# Patient Record
Sex: MALE | Race: BLACK OR AFRICAN AMERICAN | NOT HISPANIC OR LATINO | Employment: FULL TIME | URBAN - METROPOLITAN AREA
[De-identification: names, ages, dates, MRNs, and addresses within clinical notes are randomized per-mention and may not be internally consistent; named-entity substitution may affect disease eponyms.]

---

## 2022-07-04 ENCOUNTER — OFFICE VISIT (OUTPATIENT)
Dept: URGENT CARE | Facility: CLINIC | Age: 37
End: 2022-07-04
Payer: COMMERCIAL

## 2022-07-04 VITALS
OXYGEN SATURATION: 96 % | WEIGHT: 143.88 LBS | BODY MASS INDEX: 22.58 KG/M2 | HEART RATE: 67 BPM | SYSTOLIC BLOOD PRESSURE: 144 MMHG | RESPIRATION RATE: 18 BRPM | DIASTOLIC BLOOD PRESSURE: 85 MMHG | HEIGHT: 67 IN | TEMPERATURE: 98 F

## 2022-07-04 DIAGNOSIS — L02.211 ABSCESS OF ABDOMINAL WALL: Primary | ICD-10-CM

## 2022-07-04 PROCEDURE — 3077F SYST BP >= 140 MM HG: CPT | Mod: CPTII,S$GLB,, | Performed by: NURSE PRACTITIONER

## 2022-07-04 PROCEDURE — 99203 OFFICE O/P NEW LOW 30 MIN: CPT | Mod: S$GLB,,, | Performed by: NURSE PRACTITIONER

## 2022-07-04 PROCEDURE — 1160F RVW MEDS BY RX/DR IN RCRD: CPT | Mod: CPTII,S$GLB,, | Performed by: NURSE PRACTITIONER

## 2022-07-04 PROCEDURE — 3008F BODY MASS INDEX DOCD: CPT | Mod: CPTII,S$GLB,, | Performed by: NURSE PRACTITIONER

## 2022-07-04 PROCEDURE — 99203 PR OFFICE/OUTPT VISIT, NEW, LEVL III, 30-44 MIN: ICD-10-PCS | Mod: S$GLB,,, | Performed by: NURSE PRACTITIONER

## 2022-07-04 PROCEDURE — 1159F PR MEDICATION LIST DOCUMENTED IN MEDICAL RECORD: ICD-10-PCS | Mod: CPTII,S$GLB,, | Performed by: NURSE PRACTITIONER

## 2022-07-04 PROCEDURE — 3079F DIAST BP 80-89 MM HG: CPT | Mod: CPTII,S$GLB,, | Performed by: NURSE PRACTITIONER

## 2022-07-04 PROCEDURE — 3077F PR MOST RECENT SYSTOLIC BLOOD PRESSURE >= 140 MM HG: ICD-10-PCS | Mod: CPTII,S$GLB,, | Performed by: NURSE PRACTITIONER

## 2022-07-04 PROCEDURE — 3008F PR BODY MASS INDEX (BMI) DOCUMENTED: ICD-10-PCS | Mod: CPTII,S$GLB,, | Performed by: NURSE PRACTITIONER

## 2022-07-04 PROCEDURE — 1159F MED LIST DOCD IN RCRD: CPT | Mod: CPTII,S$GLB,, | Performed by: NURSE PRACTITIONER

## 2022-07-04 PROCEDURE — 3079F PR MOST RECENT DIASTOLIC BLOOD PRESSURE 80-89 MM HG: ICD-10-PCS | Mod: CPTII,S$GLB,, | Performed by: NURSE PRACTITIONER

## 2022-07-04 PROCEDURE — 1160F PR REVIEW ALL MEDS BY PRESCRIBER/CLIN PHARMACIST DOCUMENTED: ICD-10-PCS | Mod: CPTII,S$GLB,, | Performed by: NURSE PRACTITIONER

## 2022-07-04 RX ORDER — MUPIROCIN 20 MG/G
OINTMENT TOPICAL 3 TIMES DAILY
Qty: 30 G | Refills: 0 | Status: SHIPPED | OUTPATIENT
Start: 2022-07-04

## 2022-07-04 RX ORDER — SULFAMETHOXAZOLE AND TRIMETHOPRIM 800; 160 MG/1; MG/1
1 TABLET ORAL 2 TIMES DAILY
Qty: 14 TABLET | Refills: 0 | Status: SHIPPED | OUTPATIENT
Start: 2022-07-04 | End: 2022-07-04 | Stop reason: SDUPTHER

## 2022-07-04 RX ORDER — SULFAMETHOXAZOLE AND TRIMETHOPRIM 800; 160 MG/1; MG/1
1 TABLET ORAL 2 TIMES DAILY
Qty: 14 TABLET | Refills: 0 | Status: SHIPPED | OUTPATIENT
Start: 2022-07-04 | End: 2022-07-11

## 2022-07-04 RX ORDER — MUPIROCIN 20 MG/G
OINTMENT TOPICAL 3 TIMES DAILY
Qty: 30 G | Refills: 0 | Status: SHIPPED | OUTPATIENT
Start: 2022-07-04 | End: 2022-07-04 | Stop reason: SDUPTHER

## 2022-07-04 NOTE — PROGRESS NOTES
"Subjective:       Patient ID: Benito Sol is a 37 y.o. male.    Vitals:  height is 5' 6.93" (1.7 m) and weight is 65.2 kg (143 lb 13.6 oz). His temperature is 97.6 °F (36.4 °C). His blood pressure is 144/85 (abnormal) and his pulse is 67. His respiration is 18 and oxygen saturation is 96%.     Chief Complaint: Abscess    37-year-old male presents to clinic for evaluation of abscess to stomach.  Patient states that over the last 2 days it has been getting bigger.  He states that he has applied rubbing alcohol, and it opened up.  He states that it started to drain a little.  Denies fever He is awake and alert, no acute distress noted on today's visit.    Abscess  Chronicity:  NewProgression Since Onset: worsening  Location:  Torso  Associated Symptoms: no fever, no chills  Characteristics: draining, painful and blistering    Pain Scale:  10/10  Treatments Tried:  Topical antibiotics  Relieved by:  Nothing  Worsened by:  Nothing      Constitution: Negative for activity change, appetite change, chills, sweating, fatigue and fever.   HENT: Negative.    Cardiovascular: Negative.    Respiratory: Negative.    Gastrointestinal: Negative.    Skin: Positive for abscess. Negative for erythema.   Allergic/Immunologic: Positive for itching.       Objective:      Physical Exam   Constitutional: He appears well-developed.   HENT:   Head: Normocephalic and atraumatic. Head is without abrasion, without contusion and without laceration.   Ears:   Right Ear: External ear normal.   Left Ear: External ear normal.   Nose: Nose normal.   Mouth/Throat: Oropharynx is clear and moist and mucous membranes are normal.   Eyes: Conjunctivae, EOM and lids are normal. Right eye exhibits no discharge. Left eye exhibits no discharge.   Neck: Trachea normal and phonation normal. Neck supple.   Cardiovascular: Normal rate.   Pulmonary/Chest: Effort normal. No respiratory distress.   Musculoskeletal: Normal range of motion.         General: Normal " range of motion.   Neurological: He is alert.   Skin: Skin is warm, dry, intact, no rash and abscessed. lesion No abrasion, No burn, No bruising, No erythema and No ecchymosis        Psychiatric: His speech is normal and behavior is normal. Judgment and thought content normal.   Nursing note and vitals reviewed.        Assessment:       1. Abscess of abdominal wall          Plan:         Abscess of abdominal wall  -     sulfamethoxazole-trimethoprim 800-160mg (BACTRIM DS) 800-160 mg Tab; Take 1 tablet by mouth 2 (two) times daily. for 7 days  Dispense: 14 tablet; Refill: 0  -     mupirocin (BACTROBAN) 2 % ointment; Apply topically 3 (three) times daily.  Dispense: 30 g; Refill: 0    - Discussed I&D with patient, patient does not wish to have incision done at this time.  Patient only requesting antibiotics.  Discussed strict follow-up if symptoms do not improve.  Patient verbalized understanding and is in agreement with plan.    Patient Instructions   - You must understand that you have received an Urgent Care treatment only and that you may be released before all of your medical problems are known or treated.   - You, the patient, will arrange for follow up care as instructed.   - If your condition worsens or fails to improve we recommend that you receive another evaluation at the ER immediately or contact your PCP to discuss your concerns or return here.

## 2022-11-04 ENCOUNTER — OFFICE VISIT (OUTPATIENT)
Dept: URGENT CARE | Facility: CLINIC | Age: 37
End: 2022-11-04
Payer: OTHER MISCELLANEOUS

## 2022-11-04 VITALS
BODY MASS INDEX: 22.98 KG/M2 | DIASTOLIC BLOOD PRESSURE: 93 MMHG | HEART RATE: 45 BPM | HEIGHT: 66 IN | OXYGEN SATURATION: 98 % | TEMPERATURE: 98 F | SYSTOLIC BLOOD PRESSURE: 164 MMHG | WEIGHT: 143 LBS | RESPIRATION RATE: 20 BRPM

## 2022-11-04 DIAGNOSIS — S90.02XA CONTUSION OF LEFT ANKLE, INITIAL ENCOUNTER: ICD-10-CM

## 2022-11-04 DIAGNOSIS — S90.32XA CONTUSION OF LEFT FOOT, INITIAL ENCOUNTER: ICD-10-CM

## 2022-11-04 DIAGNOSIS — Z02.6 ENCOUNTER RELATED TO WORKER'S COMPENSATION CLAIM: Primary | ICD-10-CM

## 2022-11-04 LAB
CTP QC/QA: YES
POC 10 PANEL DRUG SCREEN: ABNORMAL

## 2022-11-04 PROCEDURE — 73620 X-RAY EXAM OF FOOT: CPT | Mod: LT,S$GLB,, | Performed by: RADIOLOGY

## 2022-11-04 PROCEDURE — 73610 XR ANKLE COMPLETE 3 VIEW LEFT: ICD-10-PCS | Mod: LT,S$GLB,, | Performed by: RADIOLOGY

## 2022-11-04 PROCEDURE — 80305 DRUG TEST PRSMV DIR OPT OBS: CPT | Mod: S$GLB,,,

## 2022-11-04 PROCEDURE — 99212 PR OFFICE/OUTPT VISIT, EST, LEVL II, 10-19 MIN: ICD-10-PCS | Mod: S$GLB,,,

## 2022-11-04 PROCEDURE — 73620 XR FOOT 2 VIEW LEFT: ICD-10-PCS | Mod: LT,S$GLB,, | Performed by: RADIOLOGY

## 2022-11-04 PROCEDURE — 99212 OFFICE O/P EST SF 10 MIN: CPT | Mod: S$GLB,,,

## 2022-11-04 PROCEDURE — 73610 X-RAY EXAM OF ANKLE: CPT | Mod: LT,S$GLB,, | Performed by: RADIOLOGY

## 2022-11-04 PROCEDURE — 80305 OOH NON-DOT DRUG SCREEN: ICD-10-PCS | Mod: S$GLB,,,

## 2022-11-04 NOTE — LETTER
[facility]  [address] [address]  Phone: [phone]  Fax: [phone]  [facility]: [email or web address]    Pt Name: Benito Sol  Injury Date: 11/04/2022   Employee ID: [id number] Date of First Treatment: 11/04/2022   Company: [employer]      Appointment Time: 11:15 AM Arrived: 11:00 AM    Provider: Derrick Sadler, DO Time Out: 1:45 PM     Office Treatment:   1. Encounter related to worker's compensation claim    2. Contusion of left foot, initial encounter    3. Contusion of left ankle, initial encounter          Patient Instructions: Daily home exercises/warm soaks (Use over-the-counter anti-inflammatories or Tylenol as directed on label)      Restrictions: Regular Duty, Discharged from Occupational Health     Return As needed. RANJITH

## 2022-11-04 NOTE — PROGRESS NOTES
Subjective:       Patient ID: Benito Sol is a 37 y.o. male.    Chief Complaint: Leg Injury (Left) and Foot Injury (Left)    Benito and a co-worker were pushing a cart with a large stack of soda bottles when it accidentally rolled up and onto his right foot in up to his ankle area causing him to fall backwards.  States he has left foot/ankle pain and difficulty walking as a result of the injury.  He presents to Occupational Health sitting in a wheelchair.    New Injury Left Foot/Leg (DOI 11/4/22). He works for [employer] as a . He states that another employee was pushing an excessive weight and it rolled over his left foot up his left leg. Once the weight started to roll over him he started feeling pain and a burning sensation. It is very difficult for him to walk. He states that he has numbness and slight swelling in his foot.     EC    Foot Injury       Constitution: Positive for activity change.   HENT: Negative.     Neck: neck negative.   Cardiovascular: Negative.    Eyes: Negative.    Respiratory: Negative.     Gastrointestinal: Negative.    Endocrine: negative.   Musculoskeletal:  Positive for pain, joint pain, abnormal ROM of joint and pain with walking.   Skin:  Positive for color change and bruising.   Neurological: Negative.       Objective:      Physical Exam  Vitals and nursing note reviewed.   Constitutional:       General: He is not in acute distress.  HENT:      Head: Normocephalic.   Eyes:      Conjunctiva/sclera: Conjunctivae normal.   Musculoskeletal:      Cervical back: No pain with movement.      Left foot: Normal range of motion. Tenderness present. No swelling, deformity or laceration. Normal pulse.        Feet:       Comments: There is a 1.5 cm diameter area of abraded skin to the anterior medial left ankle region.  No overlying swelling or bruising noted.  Mild tenderness over the skin abraded site and at the proximal base of the 1st metatarsal.  He has full range of motion  of the left ankle and left toes.  No ligamentous instability.  He was able to stand up and walk on the left foot without difficulty or assistance.  He does not appear to be any distress during palpation, range of motion assessment, or while walking.   Skin:     General: Skin is warm.      Capillary Refill: Capillary refill takes less than 2 seconds.   Neurological:      General: No focal deficit present.      Mental Status: He is alert and oriented to person, place, and time.      Gait: Gait is intact.   Psychiatric:         Attention and Perception: Attention normal.         Mood and Affect: Mood and affect normal.         Speech: Speech normal.         Behavior: Behavior normal. Behavior is cooperative.       No acute findings noted on radiographs.  Pending radiologist's final review.  Assessment:       1. Encounter related to worker's compensation claim    2. Contusion of left foot, initial encounter    3. Contusion of left ankle, initial encounter          Plan:       Radiograph results were discussed with him today.  He will likely have some soreness to his foot and ankle the next couple days for which she can take over-the-counter medications to help with the symptoms.  Follow-up as needed     Patient Instructions: Daily home exercises/warm soaks (Use over-the-counter anti-inflammatories or Tylenol as directed on label)   Restrictions: Regular Duty, Discharged from Occupational Health  Follow up if symptoms worsen or fail to improve.

## 2024-07-01 ENCOUNTER — OFFICE VISIT (OUTPATIENT)
Dept: URGENT CARE | Facility: CLINIC | Age: 39
End: 2024-07-01
Payer: COMMERCIAL

## 2024-07-01 VITALS
HEIGHT: 66 IN | OXYGEN SATURATION: 95 % | BODY MASS INDEX: 22.98 KG/M2 | WEIGHT: 143 LBS | RESPIRATION RATE: 18 BRPM | HEART RATE: 71 BPM | TEMPERATURE: 97 F | SYSTOLIC BLOOD PRESSURE: 143 MMHG | DIASTOLIC BLOOD PRESSURE: 87 MMHG

## 2024-07-01 DIAGNOSIS — M47.816 SPONDYLOSIS OF LUMBAR REGION WITHOUT MYELOPATHY OR RADICULOPATHY: ICD-10-CM

## 2024-07-01 DIAGNOSIS — S61.511D LACERATION OF RIGHT WRIST, SUBSEQUENT ENCOUNTER: Primary | ICD-10-CM

## 2024-07-01 PROCEDURE — 99213 OFFICE O/P EST LOW 20 MIN: CPT | Mod: S$GLB,,, | Performed by: FAMILY MEDICINE

## 2024-07-01 PROCEDURE — 99024 POSTOP FOLLOW-UP VISIT: CPT | Mod: S$GLB,,, | Performed by: FAMILY MEDICINE

## 2024-07-01 RX ORDER — OXYCODONE AND ACETAMINOPHEN 5; 325 MG/1; MG/1
1 TABLET ORAL NIGHTLY PRN
Qty: 7 TABLET | Refills: 0 | Status: SHIPPED | OUTPATIENT
Start: 2024-07-01

## 2024-07-01 RX ORDER — METHOCARBAMOL 750 MG/1
750 TABLET, FILM COATED ORAL 3 TIMES DAILY
Qty: 21 TABLET | Refills: 0 | Status: SHIPPED | OUTPATIENT
Start: 2024-07-01 | End: 2024-07-08

## 2024-07-01 NOTE — PROCEDURES
Suture Removal    Date/Time: 7/1/2024 6:30 PM  Location procedure was performed: [facility]    Performed by: Edinson Curran MD  Authorized by: Edinson Curran MD  Body area: upper extremity  Location details: right wrist  Wound Appearance: clean and well healed  Sutures Removed: 3  Post-removal: no dressing applied  Complications: No  Estimated blood loss (mL): 0  Specimens: No  Implants: No  Patient tolerance: Patient tolerated the procedure well with no immediate complications

## 2024-07-01 NOTE — PROGRESS NOTES
"Subjective:      Patient ID: Benito Sol is a 39 y.o. male.    Vitals:  height is 5' 6" (1.676 m) and weight is 64.9 kg (143 lb). His oral temperature is 97 °F (36.1 °C). His blood pressure is 143/87 (abnormal) and his pulse is 71. His respiration is 18 and oxygen saturation is 95%.     Chief Complaint: Back Pain and Suture / Staple Removal    This is a 39 y.o. male who presents today with a chief complaint of back pain and remove stitches. Pt was seen last month for the back pain and was prescribed percocet and robaxin for the pain. Pt rate the pain as 10/10.    Back Pain  This is a recurrent problem. The quality of the pain is described as stabbing. The pain does not radiate. The pain is at a severity of 10/10. The pain is severe. The pain is The same all the time. The symptoms are aggravated by bending, lying down, sitting and standing. Pertinent negatives include no abdominal pain, bladder incontinence, bowel incontinence, chest pain, dysuria, fever, headaches, leg pain, numbness, paresis, paresthesias, pelvic pain, perianal numbness, tingling, weakness or weight loss. Treatments tried: percocet,robaxin \ The treatment provided no relief.   Suture / Staple Removal  The sutures were placed 7 to 10 days ago. He tried regular soap and water washings since the wound repair. His temperature was unmeasured prior to arrival. There has been no drainage from the wound. There is no redness present. There is no swelling present. There is no pain present.     Constitution: Negative for fever.   Cardiovascular:  Negative for chest pain.   Gastrointestinal:  Negative for abdominal pain and bowel incontinence.   Genitourinary:  Negative for dysuria, bladder incontinence and pelvic pain.   Musculoskeletal:  Positive for back pain.   Neurological:  Negative for headaches and numbness.      Objective:     Physical Exam   Constitutional: He is oriented to person, place, and time. He appears well-developed. He is cooperative. " No distress.   HENT:   Head: Normocephalic and atraumatic.   Ears:   Right Ear: Hearing, tympanic membrane, external ear and ear canal normal.   Left Ear: Hearing, tympanic membrane, external ear and ear canal normal.   Nose: Nose normal. No mucosal edema or nasal deformity. No epistaxis. Right sinus exhibits no maxillary sinus tenderness and no frontal sinus tenderness. Left sinus exhibits no maxillary sinus tenderness and no frontal sinus tenderness.   Mouth/Throat: Uvula is midline, oropharynx is clear and moist and mucous membranes are normal. No trismus in the jaw. Normal dentition. No uvula swelling.   Eyes: Conjunctivae and lids are normal.   Neck: Trachea normal and phonation normal. Neck supple.   Cardiovascular: Normal rate, regular rhythm, normal heart sounds and normal pulses.   Pulmonary/Chest: Effort normal and breath sounds normal.   Abdominal: Normal appearance and bowel sounds are normal. He exhibits no mass. Soft.   Musculoskeletal:         General: No deformity.      Lumbar back: He exhibits decreased range of motion and spasm.      Comments: 3 simple interrupted sutures    Neurological: He is alert and oriented to person, place, and time. He has normal strength and normal reflexes. No sensory deficit. He exhibits normal muscle tone.   Skin: Skin is warm, dry, intact and not diaphoretic.   Psychiatric: His speech is normal and behavior is normal. Judgment and thought content normal.   Nursing note and vitals reviewed.      Assessment:     1. Laceration of right wrist, subsequent encounter    2. Spondylosis of lumbar region without myelopathy or radiculopathy        Plan:       Laceration of right wrist, subsequent encounter  -     Suture Removal    Spondylosis of lumbar region without myelopathy or radiculopathy  -     methocarbamoL (ROBAXIN) 750 MG Tab; Take 1 tablet (750 mg total) by mouth 3 (three) times daily. for 7 days  Dispense: 21 tablet; Refill: 0  -     oxyCODONE-acetaminophen (PERCOCET)  5-325 mg per tablet; Take 1 tablet by mouth nightly as needed for Pain.  Dispense: 7 tablet; Refill: 0          Medical Decision Making:   Initial Assessment:   Pt with history of previous back injury from significant trauma         Thank you for choosing [facility]!     Our goal in the Urgent Care is to always provide outstanding medical care. You may receive a survey by mail or e-mail in the next week regarding your experience today. We would greatly appreciate you completing and returning the survey. Your feedback provides us with a way to recognize our staff who provide very good care, and it helps us learn how to improve when your experience was below our aspiration of excellence.       We appreciate you trusting us with your medical care. We hope you feel better soon. We will be happy to take care of you for all of your future medical needs.  You must understand that you've received an Urgent Care treatment only and that you may be released before all your medical problems are known or treated. You, the patient, will arrange for follow up care as instructed.  Follow up with your PCP or specialty clinic as directed in the next 1-2 weeks if not improved or as needed.  You can call [phone] to schedule an appointment with the appropriate provider.  Another option is to follow up with [facility] ([email or web address]) virtually for quick simple medical advice.  If your condition worsens we recommend that you receive another evaluation at the emergency room immediately or contact your primary medical clinics after hours call service to discuss your concerns.  Please return here or go to the Emergency Department for any concerns or worsening of condition.      *If you were prescribed a narcotic or controlled medication, do not drive or operate heavy equipment or machinery while taking these medications.

## 2025-02-18 ENCOUNTER — OFFICE VISIT (OUTPATIENT)
Dept: URGENT CARE | Facility: CLINIC | Age: 40
End: 2025-02-18
Payer: MEDICARE

## 2025-02-18 VITALS
TEMPERATURE: 98 F | SYSTOLIC BLOOD PRESSURE: 149 MMHG | RESPIRATION RATE: 18 BRPM | HEIGHT: 66 IN | OXYGEN SATURATION: 99 % | BODY MASS INDEX: 22.98 KG/M2 | WEIGHT: 143 LBS | DIASTOLIC BLOOD PRESSURE: 95 MMHG | HEART RATE: 64 BPM

## 2025-02-18 DIAGNOSIS — R11.0 NAUSEA: ICD-10-CM

## 2025-02-18 DIAGNOSIS — R51.9 NONINTRACTABLE HEADACHE, UNSPECIFIED CHRONICITY PATTERN, UNSPECIFIED HEADACHE TYPE: ICD-10-CM

## 2025-02-18 DIAGNOSIS — I10 HYPERTENSION, UNSPECIFIED TYPE: Primary | ICD-10-CM

## 2025-02-18 LAB
BILIRUBIN, UA POC OHS: NEGATIVE
BLOOD, UA POC OHS: NEGATIVE
CLARITY, UA POC OHS: CLEAR
COLOR, UA POC OHS: YELLOW
CTP QC/QA: YES
CTP QC/QA: YES
GLUCOSE, UA POC OHS: NEGATIVE
KETONES, UA POC OHS: NEGATIVE
LEUKOCYTES, UA POC OHS: NEGATIVE
NITRITE, UA POC OHS: NEGATIVE
PH, UA POC OHS: 5.5
POC MOLECULAR INFLUENZA A AGN: NEGATIVE
POC MOLECULAR INFLUENZA B AGN: NEGATIVE
PROTEIN, UA POC OHS: NEGATIVE
SARS CORONAVIRUS 2 ANTIGEN: NEGATIVE
SPECIFIC GRAVITY, UA POC OHS: 1.01
UROBILINOGEN, UA POC OHS: 0.2

## 2025-02-18 RX ORDER — ACETAMINOPHEN 500 MG
500 TABLET ORAL EVERY 6 HOURS PRN
Qty: 30 TABLET | Refills: 0 | Status: SHIPPED | OUTPATIENT
Start: 2025-02-18

## 2025-02-18 RX ORDER — ONDANSETRON 4 MG/1
4 TABLET, ORALLY DISINTEGRATING ORAL EVERY 6 HOURS PRN
Qty: 10 TABLET | Refills: 0 | Status: SHIPPED | OUTPATIENT
Start: 2025-02-18

## 2025-02-18 NOTE — LETTER
February 18, 2025      [facility]  [address], [address]  [place] [address]  Phone: [phone]  Fax: [phone]       Patient: Benito Sol   YOB: 1985  Date of Visit: 02/18/2025    To Whom It May Concern:    Yani Sol  was at [facility] on 02/18/2025. The patient may return to work/school on Thursday, February 20th, 2025 with no restrictions. If you have any questions or concerns, or if I can be of further assistance, please do not hesitate to contact me.    Sincerely,          Luis A Richardson PA-C

## 2025-02-18 NOTE — PATIENT INSTRUCTIONS
Nausea: Zofran every 4-6 hours as needed  Headache: Tylenol every 4-6 hours as needed  Monitor for chest pain, shortness of breath, fever, chills, nausea, vomiting, change in vision, localized weakness, new or worsening symptoms  Please return here or go to the Emergency Department for any concerns or worsening of condition.  If you were prescribed antibiotics, please take them to completion.  If you were prescribed a narcotic medication, do not drive or operate heavy equipment or machinery while taking these medications.  Please follow up with your primary care doctor or specialist as needed.    If you  smoke, please stop smoking.

## 2025-02-18 NOTE — PROGRESS NOTES
"Subjective:      Patient ID: Benito Sol is a 39 y.o. male.    Vitals:  height is 5' 6" (1.676 m) and weight is 64.9 kg (143 lb). His oral temperature is 98.3 °F (36.8 °C). His blood pressure is 149/95 (abnormal) and his pulse is 64. His respiration is 18 and oxygen saturation is 99%.     Chief Complaint: Abdominal Pain    Pt is a 38 yo M with PMHx of schizophrenia. He is presenting with HA, N/V, SOB.  Onset of symptoms was one week. Denies any sore throat, cough, congestion, HA, fever, chills, myalgia, CP.  Pt reports using no OTC meds. He worked double shifts the last 21 days. While in clinic pt denies any symptoms. He takes Losartan for BP, he should also be on a second medication that he has not started yet.    MA note:   Pt states he has had nausea, headache ,  SOB for a week . Pt took no OTC meds for this .    Abdominal Pain  This is a new problem. The current episode started in the past 7 days. The onset quality is sudden. The problem has been unchanged. The pain is located in the generalized abdominal region. The pain is at a severity of 7/10. Associated symptoms include nausea and vomiting. Pertinent negatives include no arthralgias, constipation, diarrhea, dysuria, fever, frequency or myalgias. He has tried nothing for the symptoms.       Constitution: Negative for activity change, appetite change, chills, fatigue and fever.   HENT:  Negative for ear pain, congestion, postnasal drip, sinus pain, sinus pressure and sore throat.    Neck: Negative for neck pain and neck swelling.   Cardiovascular:  Negative for chest pain and sob on exertion.   Eyes:  Negative for eye trauma, eye discharge and eye redness.   Respiratory:  Negative for cough, shortness of breath and wheezing.    Gastrointestinal:  Positive for abdominal pain, nausea and vomiting. Negative for constipation and diarrhea.   Genitourinary:  Negative for dysuria, frequency, urgency and urine decreased.   Musculoskeletal:  Negative for pain, " joint pain, joint swelling, abnormal ROM of joint and muscle ache.   Skin:  Negative for color change, rash, laceration and erythema.   Neurological:  Negative for dizziness, light-headedness, altered mental status and numbness.   Psychiatric/Behavioral:  Negative for altered mental status and confusion.       Objective:     Physical Exam   Constitutional: He is oriented to person, place, and time. He appears well-developed.      Comments:Pt sitting erect on examination table. No acute respiratory distress, no use of accessory muscles, no notice of nasal flaring.        HENT:   Head: Normocephalic and atraumatic.   Ears:   Right Ear: External ear normal.   Left Ear: External ear normal.   Nose: Nose normal.   Mouth/Throat: Mucous membranes are normal.   Eyes: Conjunctivae and lids are normal.   Neck: Trachea normal. Neck supple.   Cardiovascular: Normal rate, regular rhythm and normal heart sounds.   Pulmonary/Chest: Effort normal and breath sounds normal. No accessory muscle usage. No apnea, no tachypnea and no bradypnea. No respiratory distress. He has no wheezes.   Lungs clear to auscultation B/L           Comments: Lungs clear to auscultation B/L      Abdominal: Normal appearance and bowel sounds are normal. He exhibits no distension and no mass. Soft. There is no abdominal tenderness.   Musculoskeletal: Normal range of motion.         General: Normal range of motion.   Neurological: He is alert and oriented to person, place, and time. He has normal strength.   Skin: Skin is warm, dry, intact, not diaphoretic and not pale. No erythema   Psychiatric: His speech is normal and behavior is normal. Judgment and thought content normal.   Nursing note and vitals reviewed.    Results for orders placed or performed in visit on 02/18/25   POCT Urinalysis(Instrument)    Collection Time: 02/18/25  3:25 PM   Result Value Ref Range    Color, POC UA Yellow Yellow, Straw, Colorless    Clarity, POC UA Clear Clear    Glucose, POC  UA Negative Negative    Bilirubin, POC UA Negative Negative    Ketones, POC UA Negative Negative    Spec Grav POC UA 1.010 1.005 - 1.030    Blood, POC UA Negative Negative    pH, POC UA 5.5 5.0 - 8.0    Protein, POC UA Negative Negative    Urobilinogen, POC UA 0.2 <=1.0    Nitrite, POC UA Negative Negative    WBC, POC UA Negative Negative   POCT Influenza A/B MOLECULAR    Collection Time: 02/18/25  3:32 PM   Result Value Ref Range    POC Molecular Influenza A Ag Negative Negative    POC Molecular Influenza B Ag Negative Negative     Acceptable Yes    SARS Coronavirus 2 Antigen, POCT Manual Read    Collection Time: 02/18/25  3:32 PM   Result Value Ref Range    SARS Coronavirus 2 Antigen Negative Negative, Presumptive Negative     Acceptable Yes          Assessment:     1. Hypertension, unspecified type    2. Nausea    3. Nonintractable headache, unspecified chronicity pattern, unspecified headache type        Plan:   I have reviewed the patient chart and pertinent past imaging/labs.      Hypertension, unspecified type    Nausea  -     POCT Influenza A/B MOLECULAR  -     SARS Coronavirus 2 Antigen, POCT Manual Read  -     POCT Urinalysis(Instrument)  -     ondansetron (ZOFRAN-ODT) 4 MG TbDL; Take 1 tablet (4 mg total) by mouth every 6 (six) hours as needed.  Dispense: 10 tablet; Refill: 0    Nonintractable headache, unspecified chronicity pattern, unspecified headache type  -     acetaminophen (TYLENOL) 500 MG tablet; Take 1 tablet (500 mg total) by mouth every 6 (six) hours as needed for Pain.  Dispense: 30 tablet; Refill: 0          Medical Decision Making:   Urgent Care Management:  At this time, pt is asymptotic, but under a lot of stress with work and trying to get medical attention.   Given strict ER precaution if SOB or CP returns or if symptoms worsen.